# Patient Record
Sex: MALE | Employment: FULL TIME | URBAN - METROPOLITAN AREA
[De-identification: names, ages, dates, MRNs, and addresses within clinical notes are randomized per-mention and may not be internally consistent; named-entity substitution may affect disease eponyms.]

---

## 2017-01-15 ENCOUNTER — HOSPITAL ENCOUNTER (OUTPATIENT)
Dept: ULTRASOUND IMAGING | Age: 42
Discharge: HOME | End: 2017-01-15
Payer: COMMERCIAL

## 2017-01-15 DIAGNOSIS — B18.1 CHRONIC HEPATITIS B (HCC): ICD-10-CM

## 2017-01-15 PROCEDURE — 76705 ECHO EXAM OF ABDOMEN: CPT

## 2017-01-17 NOTE — PROGRESS NOTES
Please call patient and let them know that their recent ultrasound is stable with no concerning lesions/masses within the liver. I will see them at their next follow up appointment.     April

## 2017-07-11 ENCOUNTER — OFFICE VISIT (OUTPATIENT)
Dept: HEMATOLOGY | Age: 42
End: 2017-07-11

## 2017-07-11 VITALS
WEIGHT: 164.6 LBS | OXYGEN SATURATION: 98 % | HEART RATE: 64 BPM | TEMPERATURE: 98.5 F | DIASTOLIC BLOOD PRESSURE: 71 MMHG | BODY MASS INDEX: 26.57 KG/M2 | SYSTOLIC BLOOD PRESSURE: 120 MMHG

## 2017-07-11 DIAGNOSIS — B18.1 CHRONIC HEPATITIS B (HCC): Primary | ICD-10-CM

## 2017-07-11 PROBLEM — Z3A.16 16 WEEKS GESTATION OF PREGNANCY: Status: ACTIVE | Noted: 2017-07-11

## 2017-07-11 RX ORDER — OMEPRAZOLE 20 MG/1
20 CAPSULE, DELAYED RELEASE ORAL DAILY
COMMUNITY
End: 2017-07-11 | Stop reason: CLARIF

## 2017-07-11 NOTE — MR AVS SNAPSHOT
Visit Information Date & Time Provider Department Dept. Phone Encounter #  
 7/11/2017 12:45 PM April Cherise Joy NP [facility] [id number] Follow-up Instructions Return in about 6 months (around 1/11/2018). Your Appointments 1/16/2018 10:30 AM  
Follow Up with April Cherise Joy NP [facility] [address] [place] ([address]) Appt Note: Follow Up  
 15[facility] [id number] [facility] [address]  
59 [facility] [address] [facility] Upcoming Health Maintenance Date Due Pneumococcal 19-64 Medium Risk (1 of 1 - PPSV23) 7/23/1992 DTaP/Tdap/Td series (1 - Tdap) 7/23/1994 INFLUENZA AGE 9 TO ADULT 8/1/2017 Allergies as of 7/11/2017  Review Complete On: 7/11/2017 By: Sybil Ellis No Known Allergies Current Immunizations  Never Reviewed No immunizations on file. Not reviewed this visit You Were Diagnosed With   
  
 Codes Comments Chronic hepatitis B ([facility]    -  Primary ICD-10-CM: B18.1 ICD-9-CM: 070.32 Vitals BP Pulse Temp Weight(growth percentile) SpO2 BMI  
 120/71 64 98.5 °F (36.9 °C) (Tympanic) 164 lb 9.6 oz (74.7 kg) 98% 26.57 kg/m2 Smoking Status Never Smoker Vitals History BMI and BSA Data Body Mass Index Body Surface Area  
 26.57 kg/m 2 1.87 m 2 Preferred Pharmacy Pharmacy Name Phone [facility] [id number] - [address] [facility], [address] [place] [address] [phone] Your Updated Medication List  
  
Notice  As of 7/11/2017  1:16 PM  
 You have not been prescribed any medications. We Performed the Following HEPATIC FUNCTION PANEL [14809 CPT(R)] HEPATITIS B, QT, PCR [15132 CPT(R)] Follow-up Instructions Return in about 6 months (around 1/11/2018). Introducing [facility] & HEALTH SERVICES! Dear Gena Bush: 
Thank you for requesting a Voucheres account.   Our records indicate that you already have an active SurveyMonkey account. You can access your account anytime at [email or web address]/Sunshine [employer] Did you know that you can access your hospital and ER discharge instructions at any time in SurveyMonkey? You can also review all of your test results from your hospital stay or ER visit. Additional Information If you have questions, please visit the Frequently Asked Questions section of the SurveyMonkey website at [email or web address] [email or web address]/Yik Yakt/. Remember, SurveyMonkey is NOT to be used for urgent needs. For medical emergencies, dial 911. Now available from your iPhone and Android! Please provide this summary of care documentation to your next provider. Your primary care clinician is listed as [address]. If you have any questions after today's visit, please call [phone].

## 2017-07-12 LAB
ALBUMIN SERPL-MCNC: 4.5 G/DL (ref 3.5–5.5)
ALP SERPL-CCNC: 68 IU/L (ref 39–117)
ALT SERPL-CCNC: 41 IU/L (ref 0–44)
AST SERPL-CCNC: 27 IU/L (ref 0–40)
BILIRUB DIRECT SERPL-MCNC: 0.12 MG/DL (ref 0–0.4)
BILIRUB SERPL-MCNC: 0.4 MG/DL (ref 0–1.2)
HBV DNA SERPL NAA+PROBE-ACNC: NORMAL IU/ML
HBV DNA SERPL NAA+PROBE-LOG IU: 4.28 LOG10IU/ML
PROT SERPL-MCNC: 7.4 G/DL (ref 6–8.5)
REF LAB TEST REF RANGE: NORMAL

## 2017-07-12 RX ORDER — TENOFOVIR DISOPROXIL FUMARATE 300 MG/1
300 TABLET, FILM COATED ORAL DAILY
COMMUNITY
End: 2017-07-12 | Stop reason: CLARIF

## 2018-01-16 ENCOUNTER — OFFICE VISIT (OUTPATIENT)
Dept: HEMATOLOGY | Age: 43
End: 2018-01-16

## 2018-01-16 VITALS
SYSTOLIC BLOOD PRESSURE: 122 MMHG | BODY MASS INDEX: 27.7 KG/M2 | HEART RATE: 79 BPM | WEIGHT: 171.6 LBS | OXYGEN SATURATION: 96 % | DIASTOLIC BLOOD PRESSURE: 74 MMHG | TEMPERATURE: 95.6 F

## 2018-01-16 DIAGNOSIS — R10.822 LEFT UPPER QUADRANT ABDOMINAL TENDERNESS WITH REBOUND TENDERNESS: ICD-10-CM

## 2018-01-16 DIAGNOSIS — B18.1 CHRONIC HEPATITIS B (HCC): Primary | ICD-10-CM

## 2018-01-16 NOTE — MR AVS SNAPSHOT
1111 [facility] [id number] [address] [place] [address] 
[phone] Patient: Oly Flores MRN: [id number] ATB:0[birth year]6006 Visit Information Date & Time Provider Department Dept. Phone Encounter #  
 1/16/2018 10:30 AM April MOLLY Jain, [facility] 219 [id number] Follow-up Instructions Return in about 6 months (around 7/16/2018). Upcoming Health Maintenance Date Due Pneumococcal 19-64 Medium Risk (1 of 1 - PPSV23) 7/23/1992 DTaP/Tdap/Td series (1 - Tdap) 7/23/1994 Influenza Age 5 to Adult 8/1/2017 Allergies as of 1/16/2018  Review Complete On: 1/16/2018 By: Janis Cruz NP No Known Allergies Current Immunizations  Never Reviewed No immunizations on file. Not reviewed this visit You Were Diagnosed With   
  
 Codes Comments Chronic hepatitis B ([facility]    -  Primary ICD-10-CM: B18.1 ICD-9-CM: 070.32 Left upper quadrant abdominal tenderness with rebound tenderness     ICD-10-CM: R10.822 ICD-9-CM: 789.62 Vitals BP Pulse Temp Weight(growth percentile) SpO2 BMI  
 122/74 (BP 1 Location: Left arm, BP Patient Position: Sitting) 79 95.6 °F (35.3 °C) (Tympanic) 171 lb 9.6 oz (77.8 kg) 96% 27.7 kg/m2 Smoking Status Never Smoker BMI and BSA Data Body Mass Index Body Surface Area  
 27.7 kg/m 2 1.9 m 2 Preferred Pharmacy Pharmacy Name Phone [facility] [id number] - [place], [address] [place] [address] [phone] Your Updated Medication List  
  
Notice  As of 1/16/2018 11:00 AM  
 You have not been prescribed any medications. We Performed the Following CBC W/O DIFF [53190 CPT(R)] HEPATITIS B, QT, PCR [17963 CPT(R)] METABOLIC PANEL, COMPREHENSIVE [33098 CPT(R)] Follow-up Instructions Return in about 6 months (around 7/16/2018). To-Do List   
 01/16/2018 Imaging:  US ABD COMP Introducing hospitals & HEALTH SERVICES! Dear Marianna Bernabe: 
Thank you for requesting a Sanivation account. Our records indicate that you already have an active Sanivation account. You can access your account anytime at [email or web address]. [email or web address]/Retention Science Did you know that you can access your hospital and ER discharge instructions at any time in Sanivation? You can also review all of your test results from your hospital stay or ER visit. Additional Information If you have questions, please visit the Frequently Asked Questions section of the Sanivation website at [email or web address] Remember, Sanivation is NOT to be used for urgent needs. For medical emergencies, dial 911. Now available from your iPhone and Android! Please provide this summary of care documentation to your next provider. Your primary care clinician is listed as 535Nisa Amaya. If you have any questions after today's visit, please call [phone].

## 2018-01-16 NOTE — PROGRESS NOTES
1. Have you been to the ER, urgent care clinic since your last visit? Hospitalized since your last visit? No    2. Have you seen or consulted any other health care providers outside of the [address] [place] [address] since your last visit? Include any pap smears or colon screening.  No   Chief Complaint   Patient presents with    Follow-up     Visit Vitals    /74 (BP 1 Location: Left arm, BP Patient Position: Sitting)    Pulse 79    Temp 95.6 °F (35.3 °C) (Tympanic)    Wt 171 lb 9.6 oz (77.8 kg)    SpO2 96%    BMI 27.7 kg/m2     PHQ over the last two weeks 1/16/2018   Little interest or pleasure in doing things Not at all   Feeling down, depressed or hopeless Not at all   Total Score PHQ 2 0     Learning Assessment 1/16/2018   PRIMARY LEARNER Patient   HIGHEST LEVEL OF EDUCATION - PRIMARY LEARNER  -   BARRIERS PRIMARY LEARNER -   CO-LEARNER CAREGIVER -   PRIMARY LANGUAGE ENGLISH   LEARNER PREFERENCE PRIMARY LISTENING   LEARNING SPECIAL TOPICS -   ANSWERED BY patient   RELATIONSHIP SELF

## 2018-01-17 LAB
ALBUMIN SERPL-MCNC: 4.4 G/DL (ref 3.5–5.5)
ALBUMIN/GLOB SERPL: 1.5 {RATIO} (ref 1.2–2.2)
ALP SERPL-CCNC: 61 IU/L (ref 39–117)
ALT SERPL-CCNC: 29 IU/L (ref 0–44)
AST SERPL-CCNC: 20 IU/L (ref 0–40)
BILIRUB SERPL-MCNC: 0.5 MG/DL (ref 0–1.2)
BUN SERPL-MCNC: 13 MG/DL (ref 6–24)
BUN/CREAT SERPL: 11 (ref 9–20)
CALCIUM SERPL-MCNC: 9.2 MG/DL (ref 8.7–10.2)
CHLORIDE SERPL-SCNC: 100 MMOL/L (ref 96–106)
CO2 SERPL-SCNC: 26 MMOL/L (ref 18–29)
CREAT SERPL-MCNC: 1.21 MG/DL (ref 0.76–1.27)
ERYTHROCYTE [DISTWIDTH] IN BLOOD BY AUTOMATED COUNT: 12.7 % (ref 12.3–15.4)
GLOBULIN SER CALC-MCNC: 2.9 G/DL (ref 1.5–4.5)
GLUCOSE SERPL-MCNC: 154 MG/DL (ref 65–99)
HBV DNA SERPL NAA+PROBE-ACNC: 9020 IU/ML
HBV DNA SERPL NAA+PROBE-LOG IU: 3.96 LOG10IU/ML
HCT VFR BLD AUTO: 45 % (ref 37.5–51)
HGB BLD-MCNC: 15.3 G/DL (ref 13–17.7)
MCH RBC QN AUTO: 28.6 PG (ref 26.6–33)
MCHC RBC AUTO-ENTMCNC: 34 G/DL (ref 31.5–35.7)
MCV RBC AUTO: 84 FL (ref 79–97)
PLATELET # BLD AUTO: 171 X10E3/UL (ref 150–379)
POTASSIUM SERPL-SCNC: 4.1 MMOL/L (ref 3.5–5.2)
PROT SERPL-MCNC: 7.3 G/DL (ref 6–8.5)
RBC # BLD AUTO: 5.35 X10E6/UL (ref 4.14–5.8)
REF LAB TEST REF RANGE: NORMAL
SODIUM SERPL-SCNC: 141 MMOL/L (ref 134–144)
WBC # BLD AUTO: 4.1 X10E3/UL (ref 3.4–10.8)

## 2018-01-17 NOTE — PROGRESS NOTES
[address] Darrius Garcia MD, [place], [facility] Keila Adon[place], [place]       KITTY Parish PA-C Salvador Honer, [facility]   KITTY Allan NP        at [address], [address] April Lion  22.     [phone]     FAX: [phone]    at [facility], [address] [place] [address], [address]     [phone]     FAX: [phone]       PCP: Paula Fink MD  GI: Thelma Menezes     Patient Active Problem List    Diagnosis Date Noted    Chronic hepatitis B ([facility] 12/02/2011    S/P sinus surgery 12/02/2011     Eliana loza a 38 y.o. male returns to the [address] regarding chronic HBV and its management. The active problem list, all pertinent past medical history, medications, radiologic findings and laboratory findings related to the liver disorder were reviewed with the patient. The patient has not required treatment for his HBV due to his inactive state. His HBV DNA has remained at low levels and his liver enzymes within normal range. At his last office visit in July 2017, his HBV DNA had significantly increased but his liver enzymes remained normal. He was told to return in 1-2 months to repeat blood work. He did not return. He presents to the clinic today to monitor his HBV. The most recent laboratory studies indicate that the liver transaminases are normal, alkaline phosphatase is normal, tests of hepatic synthetic and metabolic function are normal, and the platelet count is normal.     The patient feels well but does have generalized abdominal discomfort. There is no N/V/D associated with this pain. The patient completes all daily activities without any functional limitations. He continues to work full time. The patient has not noted fatigue, arthralgias, swelling of the lower extremities, hematemesis, hematochezia.      ALLERGIES:   No Known Allergies    MEDICATIONS:  No current outpatient prescriptions on file prior to visit. No current facility-administered medications on file prior to visit. REVIEW OF SYSTEMS:   Systems related to all organ systems were reviewed. All were negative except as noted above. FAMILY/SOCIAL HISTORY:   The patient is . There are 2 children. The patient has never smoked tobacco products. The patient has never consumed significant amounts of alcohol. The patient currently works full time as mailhandler. The patient's wife is also from [place]. He states that she has been tested but does not have HBV. Unclear if she has no markers or immune. The 2 children were vaccinated after birth. PHYSICAL EXAMINATION:   Vital Signs:   Visit Vitals    /74 (BP 1 Location: Left arm, BP Patient Position: Sitting)    Pulse 79    Temp 95.6 °F (35.3 °C) (Tympanic)    Wt 171 lb 9.6 oz (77.8 kg)    SpO2 96%    BMI 27.7 kg/m2     General: No acute distress. Eyes: Sclera anicteric. ENT: No oral lesions, thyroid normal.   Nodes: No adenopathy. Skin: No spider angiomata, jaundice, palmar erythema. Respiratory: Lungs clear to auscultation. Cardiovascular: Regular heart rate, no murmurs, no JVD. Abdomen: Soft non-tender, liver size normal to percussion/palpation. Spleen not palpable. No obvious ascites. Extremities: No edema, no muscle wasting, no gross arthritic changes. Neurologic: Alert and oriented, cranial nerves grossly intact, no asterixsis.      LABORATORY STUDIES:   [facility] [place] of [address] Units 1/16/2018 7/11/2017   WBC 3.4 - 10.8 x10E3/uL 4.1    HGB 13.0 - 17.7 g/dL 15.3     - 379 x10E3/uL 171    AST 0 - 40 IU/L 20 27   ALT 0 - 44 IU/L 29 41   Alk Phos 39 - 117 IU/L 61 68   Bili, Total 0.0 - 1.2 mg/dL 0.5 0.4   Bili, Direct 0.00 - 0.40 mg/dL  0.12   Albumin 3.5 - 5.5 g/dL 4.4 4.5   BUN 6 - 24 mg/dL 13    Creat 0.76 - 1.27 mg/dL 1.21    Na 134 - 144 mmol/L 141    K 3.5 - 5.2 mmol/L 4.1    Cl 96 - 106 mmol/L 100    CO2 18 - 29 mmol/L 26    Glucose 65 - 99 mg/dL 154 (H)    Virology Latest Ref Rng & Units  7/11/2017   HBV DNA IU/mL  07107     A hepatic function panel and HBV DNA were ordered today. Will review results when available. SEROLOGIES:   11/2011: HBV DNA 8,470 IU/ml, anti-HCV negative. LIVER HISTOLOGY:   Not performed     ENDOSCOPIC PROCEDURES:   Not performed     RADIOLOGY:   2009: Ultrasound liver. Reported to be normal.   12/2015. Ultrasound of liver. Diffuse hepatic steatosis. No liver mass lesions. No dilated bile ducts. No ascites. 1/2017. Ultrasound of liver. Echogenic consistent with fatty liver. No liver mass lesions. No dilated bile ducts. No ascites. OTHER TESTING:   Not available     ASSESSMENT AND PLAN:   Chronic HBV with normal liver transaminases, normal liver function and a normal platelet count. HBV DNA is pending. The picture is consistent with inactive HBV. No treatment is required at this time. Will review HBV DNA from today's visit to confirm this. There is no need to perform liver biopsy. He will continue regular follow up to monitor his chronic HBV. No signs or symptoms of significant liver fibrosis. Abdominal pain. US has been ordered to further evaluate. The patient was counseled regarding alcohol consumption. There are no contraindications for the patient to take any medications that are necessary for treatment of other medical issues. [facility] 75. surveillance. Patient is up to date with an unremarkable ultrasound in January 2017. Next imaging will be in January 2018. This was ordered today. [facility] 87 in 6 months.      Jah Barr NP  Liver [place] [address], [address] [place] [address]  Ph: [phone]  Fax: [phone]  Email: [email or web address]

## 2018-01-21 ENCOUNTER — HOSPITAL ENCOUNTER (OUTPATIENT)
Dept: ULTRASOUND IMAGING | Age: 43
Discharge: HOME | End: 2018-01-21
Payer: COMMERCIAL

## 2018-01-21 DIAGNOSIS — R10.822 LEFT UPPER QUADRANT ABDOMINAL TENDERNESS WITH REBOUND TENDERNESS: ICD-10-CM

## 2018-01-21 DIAGNOSIS — B18.1 CHRONIC HEPATITIS B (HCC): ICD-10-CM

## 2018-01-21 PROCEDURE — 76700 US EXAM ABDOM COMPLETE: CPT

## 2018-09-11 ENCOUNTER — OFFICE VISIT (OUTPATIENT)
Dept: HEMATOLOGY | Age: 43
End: 2018-09-11

## 2018-09-11 VITALS
WEIGHT: 159 LBS | DIASTOLIC BLOOD PRESSURE: 73 MMHG | OXYGEN SATURATION: 97 % | HEART RATE: 64 BPM | SYSTOLIC BLOOD PRESSURE: 113 MMHG | TEMPERATURE: 97.9 F | BODY MASS INDEX: 25.66 KG/M2

## 2018-09-11 DIAGNOSIS — B18.1 CHRONIC HEPATITIS B (HCC): Primary | ICD-10-CM

## 2018-09-11 NOTE — PROGRESS NOTES
1. Have you been to the ER, urgent care clinic since your last visit? Hospitalized since your last visit? No    2. Have you seen or consulted any other health care providers outside of the [address] [place] [address] since your last visit? Include any pap smears or colon screening. No   Chief Complaint   Patient presents with    Follow-up     Visit Vitals    /73 (BP 1 Location: Left arm, BP Patient Position: Sitting)    Pulse 64    Temp 97.9 °F (36.6 °C) (Tympanic)    Wt 159 lb (72.1 kg)    SpO2 97%    BMI 25.66 kg/m2     PHQ over the last two weeks 9/11/2018   Little interest or pleasure in doing things Not at all   Feeling down, depressed, irritable, or hopeless Not at all   Total Score PHQ 2 0     Learning Assessment 9/11/2018   PRIMARY LEARNER Patient   HIGHEST LEVEL OF EDUCATION - PRIMARY LEARNER  -   BARRIERS PRIMARY LEARNER NONE   CO-LEARNER CAREGIVER No   PRIMARY LANGUAGE ENGLISH   LEARNER PREFERENCE PRIMARY LISTENING   LEARNING SPECIAL TOPICS -   ANSWERED BY patient   RELATIONSHIP SELF     Abuse Screening Questionnaire 9/11/2018   Do you ever feel afraid of your partner? N   Are you in a relationship with someone who physically or mentally threatens you? N   Is it safe for you to go home?  Priyanka Aguirre

## 2018-09-11 NOTE — MR AVS SNAPSHOT
1111 [facility] [id number] [address] [place] [address] 
[phone] Patient: Herbert Parikh MRN: [id number] ZXS:8/23/1926 Visit Information Date & Time Provider Department Dept. Phone Encounter #  
 9/11/2018  1:00 PM April MOLLY [place] 96, [facility] 219 [id number] Follow-up Instructions Return in about 6 months (around 3/11/2019). Upcoming Health Maintenance Date Due Pneumococcal 19-64 Medium Risk (1 of 1 - PPSV23) 7/23/1992 DTaP/Tdap/Td series (1 - Tdap) 7/23/1994 Influenza Age 5 to Adult 8/1/2018 Allergies as of 9/11/2018  Review Complete On: 9/11/2018 By: Janis Watson NP No Known Allergies Current Immunizations  Never Reviewed No immunizations on file. Not reviewed this visit You Were Diagnosed With   
  
 Codes Comments Chronic hepatitis B ([facility]    -  Primary ICD-10-CM: B18.1 ICD-9-CM: 070.32 Vitals BP Pulse Temp Weight(growth percentile) SpO2 BMI  
 113/73 (BP 1 Location: Left arm, BP Patient Position: Sitting) 64 97.9 °F (36.6 °C) (Tympanic) 159 lb (72.1 kg) 97% 25.66 kg/m2 Smoking Status Never Smoker BMI and BSA Data Body Mass Index Body Surface Area  
 25.66 kg/m 2 1.83 m 2 Preferred Pharmacy Pharmacy Name Phone [facility] # [id number] - [address] [facility] [address], [address] [place] [address] [phone] Your Updated Medication List  
  
Notice  As of 9/11/2018  1:30 PM  
 You have not been prescribed any medications. We Performed the Following AFP WITH AFP-L3% [[id number] Custom] CBC W/O DIFF [87385 CPT(R)] HEPATITIS B, QT, PCR [62206 CPT(R)] HEPATITIS BE AB [33664 CPT(R)] HEPATITIS BE AG [79013 CPT(R)] METABOLIC PANEL, COMPREHENSIVE [40398 CPT(R)] Follow-up Instructions Return in about 6 months (around 3/11/2019). To-Do List   
 01/11/2019 Imaging:  US ABD LTD Introducing [facility] & HEALTH SERVICES! Dear Juancarlos Hyatt: 
Thank you for requesting a ClearEdge3D account. Our records indicate that you already have an active ClearEdge3D account. You can access your account anytime at [email or web address] Did you know that you can access your hospital and ER discharge instructions at any time in ClearEdge3D? You can also review all of your test results from your hospital stay or ER visit. Additional Information If you have questions, please visit the Frequently Asked Questions section of the ClearEdge3D website at [email or web address] Remember, ClearEdge3D is NOT to be used for urgent needs. For medical emergencies, dial 911. Now available from your iPhone and Android! Please provide this summary of care documentation to your next provider. Your primary care clinician is listed as [address]Nisa Amaya. If you have any questions after today's visit, please call [phone].

## 2018-09-11 NOTE — PROGRESS NOTES
[address] E Rebound MD Jose, [address], [facility], [place]       KITTY Umana PA-C Cleora Haddock, [facility]   KITTY Cruz Res, NP        at [address], [address] [facility], [address]     [phone]     FAX: [phone]    at [address], [address] [place] [address], [address]     [phone]     FAX: [phone]       PCP: Mahi Johnson MD  GI: Bhavin Wan     Patient Active Problem List    Diagnosis Date Noted    Chronic hepatitis B ([facility] 12/02/2011    S/P sinus surgery 12/02/2011     Melissa Irwin is a 37 y.o. male returns to the [address] regarding chronic HBV and its management. The active problem list, all pertinent past medical history, medications, radiologic findings and laboratory findings related to the liver disorder were reviewed with the patient. The patient has not required treatment for his HBV due to his inactive state. His HBV DNA has remained at low levels and his liver enzymes within normal range. At his last office visit in July 2017, his HBV DNA had significantly increased but his liver enzymes remained normal. He was told to return in 1-2 months to repeat blood work. He did not return. He presents to the clinic today to monitor his HBV. The most recent laboratory studies indicate that the liver transaminases are normal, alkaline phosphatase is normal, tests of hepatic synthetic and metabolic function are normal, and the platelet count is normal.     The patient feels well with no new physical complaints today. The patient completes all daily activities without any functional limitations. He continues to work full time. The patient has not noted fatigue, arthralgias, swelling of the lower extremities, hematemesis, hematochezia.      ALLERGIES:   No Known Allergies    MEDICATIONS:  No current outpatient prescriptions on file prior to visit. No current facility-administered medications on file prior to visit. SYSTEM REVIEW NOT RELATED TO LIVER DISEASE OR REVIEWED ABOVE:  Constitution systems: Negative for fever, chills, weight gain, weight loss. Eyes: Negative for visual changes. ENT: Negative for sore throat, painful swallowing. Respiratory: Negative for cough, hemoptysis, SOB. Cardiology: Negative for chest pain, palpitations. GI:  Negative for constipation or diarrhea. : Negative for urinary frequency, dysuria, hematuria, nocturia. Skin: Negative for rash. Hematology: Negative for easy bruising, blood clots. Musculo-skelatal: Negative for back pain, muscle pain, weakness. Neurologic: Negative for headaches, dizziness, vertigo, memory problems not related to HE. Psychology: Negative for anxiety, depression. FAMILY/SOCIAL HISTORY:   The patient is . There are 2 children. The patient has never smoked tobacco products. The patient has never consumed significant amounts of alcohol. The patient currently works full time as mailhandler. The patient's wife is also from [place]. He states that she has been tested but does not have HBV. Unclear if she has no markers or immune. The 2 children were vaccinated after birth. PHYSICAL EXAMINATION:   Vital Signs:   Visit Vitals    /73 (BP 1 Location: Left arm, BP Patient Position: Sitting)    Pulse 64    Temp 97.9 °F (36.6 °C) (Tympanic)    Wt 159 lb (72.1 kg)    SpO2 97%    BMI 25.66 kg/m2     General: No acute distress. Eyes: Sclera anicteric. ENT: No oral lesions, thyroid normal.   Nodes: No adenopathy. Skin: No spider angiomata, jaundice, palmar erythema. Respiratory: Lungs clear to auscultation. Cardiovascular: Regular heart rate, no murmurs, no JVD. Abdomen: Soft non-tender, liver size normal to percussion/palpation. Spleen not palpable. No obvious ascites.    Extremities: No edema, no muscle wasting, no gross arthritic changes. Neurologic: Alert and oriented, cranial nerves grossly intact, no asterixsis. LABORATORY STUDIES:   Liver [place] of [address] Units 9/11/2018 1/16/2018   WBC 3.4 - 10.8 x10E3/uL 5.1 4.1   HGB 13.0 - 17.7 g/dL 14.6 15.3    - 379 x10E3/uL 160 171   AST 0 - 40 IU/L 20 20   ALT 0 - 44 IU/L 17 29   Alk Phos 39 - 117 IU/L 72 61   Bili, Total 0.0 - 1.2 mg/dL 0.8 0.5   Bili, Direct 0.00 - 0.40 mg/dL     Albumin 3.5 - 5.5 g/dL 4.5 4.4   BUN 6 - 24 mg/dL 15 13   Creat 0.76 - 1.27 mg/dL 1.06 1.21   Na 134 - 144 mmol/L 140 141   K 3.5 - 5.2 mmol/L 4.2 4.1   Cl 96 - 106 mmol/L 101 100   CO2 20 - 29 mmol/L 24 26   Glucose 65 - 99 mg/dL 111 (H) 154 (H)   Virology Latest Ref Rng & Units 9/11/2018 1/16/2018   HBV DNA IU/mL 85665 9020   HBV DNA Copies/mL     Cancer Screening Latest Ref Rng & Units 9/11/2018    AFP, Serum 0.0 - 8.0 ng/mL 1.5    AFP-L3% 0.0 - 9.9 % Comment      A hepatic function panel and HBV DNA were ordered today. Will review results when available. SEROLOGIES:   11/2011: HBV DNA 8,470 IU/ml, anti-HCV negative. LIVER HISTOLOGY:   Not performed     ENDOSCOPIC PROCEDURES:   Not performed     RADIOLOGY:   2009: Ultrasound liver. Reported to be normal.   12/2015. Ultrasound of liver. Diffuse hepatic steatosis. No liver mass lesions. No dilated bile ducts. No ascites. 1/2017. Ultrasound of liver. Echogenic consistent with fatty liver. No liver mass lesions. No dilated bile ducts. No ascites. 1/2018. Ultrasound of liver. Echogenic consistent with chronic liver disease. No liver mass lesions. No dilated bile ducts. No ascites. OTHER TESTING:   Not available     ASSESSMENT AND PLAN:   Chronic HBV with normal liver transaminases, normal liver function and a normal platelet count. HBV DNA has significantly increased since last blood work. Directed patient to return to the clinic in 3 months (December 2018) for a 'lab only' appointment to recheck labs.  If it is still elevated, Vemlidy will be prescribed. There is no need to perform liver biopsy. He will continue regular follow up to monitor his chronic HBV. No signs or symptoms of significant liver fibrosis. [facility] 75. surveillance. Patient is up to date with an unremarkable ultrasound in January 2018. Next imaging will be in January 2019. This was ordered and scheduled today. The patient was counseled regarding alcohol consumption. There are no contraindications for the patient to take any medications that are necessary for treatment of other medical issues. [facility] [place] [address] in 6 months.      Erasmo Kohli NP  Liver [place] [address]  Ph: [phone]  Fax: [phone]  Email: [email or web address]

## 2018-09-12 LAB
AFP L3 MFR SERPL: NORMAL % (ref 0–9.9)
AFP SERPL-MCNC: 1.5 NG/ML (ref 0–8)
ALBUMIN SERPL-MCNC: 4.5 G/DL (ref 3.5–5.5)
ALBUMIN/GLOB SERPL: 1.6 {RATIO} (ref 1.2–2.2)
ALP SERPL-CCNC: 72 IU/L (ref 39–117)
ALT SERPL-CCNC: 17 IU/L (ref 0–44)
AST SERPL-CCNC: 20 IU/L (ref 0–40)
BILIRUB SERPL-MCNC: 0.8 MG/DL (ref 0–1.2)
BUN SERPL-MCNC: 15 MG/DL (ref 6–24)
BUN/CREAT SERPL: 14 (ref 9–20)
CALCIUM SERPL-MCNC: 9.3 MG/DL (ref 8.7–10.2)
CHLORIDE SERPL-SCNC: 101 MMOL/L (ref 96–106)
CO2 SERPL-SCNC: 24 MMOL/L (ref 20–29)
CREAT SERPL-MCNC: 1.06 MG/DL (ref 0.76–1.27)
ERYTHROCYTE [DISTWIDTH] IN BLOOD BY AUTOMATED COUNT: 13.1 % (ref 12.3–15.4)
GLOBULIN SER CALC-MCNC: 2.8 G/DL (ref 1.5–4.5)
GLUCOSE SERPL-MCNC: 111 MG/DL (ref 65–99)
HBV DNA SERPL NAA+PROBE-ACNC: NORMAL IU/ML
HBV DNA SERPL NAA+PROBE-LOG IU: 4.38 LOG10 IU/ML
HBV E AG SERPL QL IA: NEGATIVE
HCT VFR BLD AUTO: 42.4 % (ref 37.5–51)
HGB BLD-MCNC: 14.6 G/DL (ref 13–17.7)
MCH RBC QN AUTO: 28.6 PG (ref 26.6–33)
MCHC RBC AUTO-ENTMCNC: 34.4 G/DL (ref 31.5–35.7)
MCV RBC AUTO: 83 FL (ref 79–97)
PLATELET # BLD AUTO: 160 X10E3/UL (ref 150–379)
POTASSIUM SERPL-SCNC: 4.2 MMOL/L (ref 3.5–5.2)
PROT SERPL-MCNC: 7.3 G/DL (ref 6–8.5)
RBC # BLD AUTO: 5.1 X10E6/UL (ref 4.14–5.8)
REF LAB TEST REF RANGE: NORMAL
SODIUM SERPL-SCNC: 140 MMOL/L (ref 134–144)
WBC # BLD AUTO: 5.1 X10E3/UL (ref 3.4–10.8)

## 2018-09-13 LAB — HBV E AB SERPL QL IA: POSITIVE

## 2018-12-23 ENCOUNTER — HOSPITAL ENCOUNTER (OUTPATIENT)
Dept: ULTRASOUND IMAGING | Age: 43
Discharge: HOME | End: 2018-12-23
Attending: NURSE PRACTITIONER
Payer: COMMERCIAL

## 2018-12-23 DIAGNOSIS — B18.1 CHRONIC HEPATITIS B (HCC): ICD-10-CM

## 2018-12-23 PROCEDURE — 76705 ECHO EXAM OF ABDOMEN: CPT

## 2019-03-12 ENCOUNTER — OFFICE VISIT (OUTPATIENT)
Dept: HEMATOLOGY | Age: 44
End: 2019-03-12

## 2019-03-12 VITALS
OXYGEN SATURATION: 98 % | TEMPERATURE: 97.9 F | BODY MASS INDEX: 26.26 KG/M2 | WEIGHT: 163.4 LBS | HEART RATE: 69 BPM | HEIGHT: 66 IN | DIASTOLIC BLOOD PRESSURE: 70 MMHG | SYSTOLIC BLOOD PRESSURE: 114 MMHG

## 2019-03-12 DIAGNOSIS — B18.1 CHRONIC HEPATITIS B (HCC): Primary | ICD-10-CM

## 2019-03-12 NOTE — PROGRESS NOTES
[address] MD Jose, [address], [place], [place]       Amena Roe, KITTY Issa, AMILCAR Figueroa, [facility]   KITTY Power NP        at [address], [address] [address].     [phone]     FAX: [phone]    at [address], [address]     [phone]     FAX: [phone]     PCP: Ragini Stewart MD  GI: John Quan     Patient Active Problem List    Diagnosis Date Noted    Chronic hepatitis B ([facility] 12/02/2011    S/P sinus surgery 12/02/2011     Essie Moore is a 37 y.o. male returns to the [address] regarding chronic HBV and its management. The active problem list, all pertinent past medical history, medications, radiologic findings and laboratory findings related to the liver disorder were reviewed with the patient. The patient has not required treatment for his HBV due to his inactive state. His HBV DNA level has been elevated twice in the last 3 checks but his LFTs have remained normal.     At his last office visit in July 2017, his HBV DNA had significantly increased but his liver enzymes remained normal. He was told to return in 1-2 months to repeat blood work. His level had decreased in 1/2018 but was elevated again in 9/2018. He was told to come in 12/2018 to have levels rechecked per results letter. I do not see any lab orders or results for that time frame. He presents to the clinic today to monitor his HBV. The most recent laboratory studies indicate the liver transaminases are normal, alkaline phosphatase is normal, tests of hepatic synthetic and metabolic function are normal and the platelet count is normal.     The patient feels well with no new physical complaints today. The patient completes all daily activities without any functional limitations.  He continues to work full time. The patient has not noted fatigue, arthralgias, swelling of the lower extremities, hematemesis or hematochezia. ALLERGIES:   No Known Allergies    MEDICATIONS:  No current outpatient medications on file prior to visit. No current facility-administered medications on file prior to visit. SYSTEM REVIEW NOT RELATED TO LIVER DISEASE OR REVIEWED ABOVE:  Constitution systems: Negative for fever, chills, weight gain, weight loss. Eyes: Negative for visual changes. ENT: Negative for sore throat, painful swallowing. Respiratory: Negative for cough, hemoptysis, SOB. Cardiology: Negative for chest pain, palpitations. GI:  Negative for constipation or diarrhea. : Negative for urinary frequency, dysuria, hematuria, nocturia. Skin: Negative for rash. Hematology: Negative for easy bruising, blood clots. Musculo-skeletal: Negative for back pain, muscle pain, weakness. Neurologic: Negative for headaches, dizziness, vertigo, memory problems not related to HE. Psychology: Negative for anxiety, depression. FAMILY/SOCIAL HISTORY:   The patient is . The patient has 2 children. The patient has never smoked tobacco products. The patient has never consumed significant amounts of alcohol. The patient currently works full time as . The patient's wife is also from [place]. He states she has been tested but does not have HBV. Unclear if she has no markers or immune. The 2 children were vaccinated after birth. PHYSICAL EXAMINATION:   Visit Vitals  /70 (BP 1 Location: Left arm, BP Patient Position: Sitting)   Pulse 69   Temp 97.9 °F (36.6 °C) (Tympanic)   Ht 5' 6\" (1.676 m)   Wt 163 lb 6.4 oz (74.1 kg)   SpO2 98%   BMI 26.37 kg/m²     General: No acute distress. Eyes: Sclera anicteric. ENT: No oral lesions. Nodes: No adenopathy. Skin: No spider angiomata, jaundice, palmar erythema. Respiratory: Lungs clear to auscultation.    Cardiovascular: Regular heart rate, no murmurs, no JVD. Abdomen: Soft non-tender, liver size normal to percussion/palpation. Spleen not palpable. No obvious ascites. Extremities: No edema, no muscle wasting, no gross arthritic changes. Neurologic: Alert and oriented, cranial nerves grossly intact, no asterixis. LABORATORY STUDIES:   Liver [place] [address] & Units 9/11/2018 1/16/2018   WBC 3.4 - 10.8 x10E3/uL 5.1 4.1   HGB 13.0 - 17.7 g/dL 14.6 15.3    - 379 x10E3/uL 160 171   AST 0 - 40 IU/L 20 20   ALT 0 - 44 IU/L 17 29   Alk Phos 39 - 117 IU/L 72 61   Bili, Total 0.0 - 1.2 mg/dL 0.8 0.5   Bili, Direct 0.00 - 0.40 mg/dL     Albumin 3.5 - 5.5 g/dL 4.5 4.4   BUN 6 - 24 mg/dL 15 13   Creat 0.76 - 1.27 mg/dL 1.06 1.21   Na 134 - 144 mmol/L 140 141   K 3.5 - 5.2 mmol/L 4.2 4.1   Cl 96 - 106 mmol/L 101 100   CO2 20 - 29 mmol/L 24 26   Glucose 65 - 99 mg/dL 111 (H) 154 (H)     Liver [place] [address] Ref Rng & Units 7/11/2017   WBC 3.4 - 10.8 x10E3/uL    HGB 13.0 - 17.7 g/dL     - 379 x10E3/uL    AST 0 - 40 IU/L 27   ALT 0 - 44 IU/L 41   Alk Phos 39 - 117 IU/L 68   Bili, Total 0.0 - 1.2 mg/dL 0.4   Bili, Direct 0.00 - 0.40 mg/dL 0.12   Albumin 3.5 - 5.5 g/dL 4.5   BUN 6 - 24 mg/dL    Creat 0.76 - 1.27 mg/dL    Na 134 - 144 mmol/L    K 3.5 - 5.2 mmol/L    Cl 96 - 106 mmol/L    CO2 20 - 29 mmol/L    Glucose 65 - 99 mg/dL      SEROLOGIES:   Virology Latest Ref Rng & Units 9/11/2018 1/16/2018 7/11/2017   HBV DNA IU/mL 23,900 9,020 19,200   HBV DNA Copies/mL        11/2011: HBV DNA 8,470 IU/ml, anti-HCV negative. LIVER HISTOLOGY:   Not performed     ENDOSCOPIC PROCEDURES:   Not performed     RADIOLOGY:   1/2019. Abdominal ultrasound. Echogenic consistent with chronic liver disease. No liver masses or lesions. No dilated bile ducts. No ascites. 1/2018. Ultrasound of liver. Echogenic consistent with chronic liver disease. No liver mass lesions. No dilated bile ducts.  No ascites. 1/2017. Ultrasound of liver. Echogenic consistent with fatty liver. No liver mass lesions. No dilated bile ducts. No ascites. 12/2015. Ultrasound of liver. Diffuse hepatic steatosis. No liver mass lesions. No dilated bile ducts. No ascites. 2009: Ultrasound liver. Reported to be normal.     OTHER TESTING:   Not available     ASSESSMENT AND PLAN:   Chronic HBV with normal liver transaminases, normal liver function and a normal platelet count. HBV DNA has significantly increased since last blood work. Will recheck numbers today. If it is still elevated, Vemlidy will be prescribed. There is no need to perform liver biopsy. He will continue regular follow up to monitor his chronic HBV. No signs or symptoms of significant liver fibrosis. [facility]. Last imaging in 12/2018 and no signs of hepatic mass or lesion. Last AFP ordered 9/11/2018. This needs to be repeated annually. The patient was counseled regarding alcohol consumption. There are no contraindications for the patient to take any medications necessary for treatment of other medical issues. [facility] 87 in 6 months.      SACHA Weber-BC  Liver [place] of [facility] [address] [facility] [email or web address], [address] [address]  [address]  22.  [phone]

## 2019-03-12 NOTE — PROGRESS NOTES
1. Have you been to the ER, urgent care clinic since your last visit? Hospitalized since your last visit? No    2. Have you seen or consulted any other health care providers outside of the [address] [place] [address] since your last visit? Include any pap smears or colon screening. No     Chief Complaint   Patient presents with    Follow-up     Visit Vitals  /70 (BP 1 Location: Left arm, BP Patient Position: Sitting)   Pulse 69   Temp 97.9 °F (36.6 °C) (Tympanic)   Ht 5' 6\" (1.676 m)   Wt 163 lb 6.4 oz (74.1 kg)   SpO2 98%   BMI 26.37 kg/m²     3 most recent PHQ Screens 3/12/2019   Little interest or pleasure in doing things Not at all   Feeling down, depressed, irritable, or hopeless Not at all   Total Score PHQ 2 0     Learning Assessment 3/12/2019   PRIMARY LEARNER Patient   HIGHEST LEVEL OF EDUCATION - PRIMARY LEARNER  -   BARRIERS PRIMARY LEARNER NONE   CO-LEARNER CAREGIVER No   PRIMARY LANGUAGE ENGLISH   LEARNER PREFERENCE PRIMARY LISTENING   LEARNING SPECIAL TOPICS -   ANSWERED BY patient   RELATIONSHIP SELF     Abuse Screening Questionnaire 3/12/2019   Do you ever feel afraid of your partner? N   Are you in a relationship with someone who physically or mentally threatens you? N   Is it safe for you to go home?  Murleen Nissen

## 2019-03-13 LAB
ALBUMIN SERPL-MCNC: 4.7 G/DL (ref 3.5–5.5)
ALP SERPL-CCNC: 64 IU/L (ref 39–117)
ALT SERPL-CCNC: 27 IU/L (ref 0–44)
AST SERPL-CCNC: 22 IU/L (ref 0–40)
BILIRUB DIRECT SERPL-MCNC: 0.15 MG/DL (ref 0–0.4)
BILIRUB SERPL-MCNC: 0.7 MG/DL (ref 0–1.2)
BUN SERPL-MCNC: 13 MG/DL (ref 6–24)
BUN/CREAT SERPL: 13 (ref 9–20)
CALCIUM SERPL-MCNC: 9 MG/DL (ref 8.7–10.2)
CHLORIDE SERPL-SCNC: 101 MMOL/L (ref 96–106)
CO2 SERPL-SCNC: 26 MMOL/L (ref 20–29)
CREAT SERPL-MCNC: 1.02 MG/DL (ref 0.76–1.27)
ERYTHROCYTE [DISTWIDTH] IN BLOOD BY AUTOMATED COUNT: 12.8 % (ref 12.3–15.4)
GLUCOSE SERPL-MCNC: 110 MG/DL (ref 65–99)
HCT VFR BLD AUTO: 43.5 % (ref 37.5–51)
HGB BLD-MCNC: 14.7 G/DL (ref 13–17.7)
INR PPP: 1 (ref 0.8–1.2)
MCH RBC QN AUTO: 28.7 PG (ref 26.6–33)
MCHC RBC AUTO-ENTMCNC: 33.8 G/DL (ref 31.5–35.7)
MCV RBC AUTO: 85 FL (ref 79–97)
PLATELET # BLD AUTO: 186 X10E3/UL (ref 150–379)
POTASSIUM SERPL-SCNC: 4 MMOL/L (ref 3.5–5.2)
PROT SERPL-MCNC: 7.3 G/DL (ref 6–8.5)
PROTHROMBIN TIME: 10.5 SEC (ref 9.1–12)
RBC # BLD AUTO: 5.12 X10E6/UL (ref 4.14–5.8)
SODIUM SERPL-SCNC: 139 MMOL/L (ref 134–144)
WBC # BLD AUTO: 3.8 X10E3/UL (ref 3.4–10.8)

## 2019-03-14 LAB
HBV DNA SERPL NAA+PROBE-ACNC: NORMAL IU/ML
HBV DNA SERPL NAA+PROBE-LOG IU: 4.71 LOG10 IU/ML
REF LAB TEST REF RANGE: NORMAL

## 2019-05-06 ENCOUNTER — TELEPHONE (OUTPATIENT)
Dept: HEMATOLOGY | Age: 44
End: 2019-05-06

## 2019-05-06 DIAGNOSIS — B18.1 CHRONIC HEPATITIS B (HCC): Primary | ICD-10-CM

## 2019-05-06 NOTE — TELEPHONE ENCOUNTER
Patient call stating he received a call from his pharmacy that [facility] was ready for him to . Patient reviewed My Chart to find a message from Sadiq Garcia NP, who stated his levels were elevated and to begin treatment. Patient is requesting a second blood draw  confirmation since the last results are from two months ago. Patient questioned why he was finding out via My Chart two months later. Apologized to patient. Inform patient this nurse will review results with PARDEEP Gaston and return his call. Reviewed results with PARDEEP Gaston. Recommendation was for patient to repeat labs. Decision will be made after reviewing repeat lab results. 11:15am:  Patient informed to repeat labs. Patient requesting orders be faxed to the [facility] near [place]. Phone number given to me by patient. Location confirmed via internet search. Order will be faxed to location . Apologized to patient again for the last notice of results. Patient had no further questions.

## 2019-05-08 LAB
ERYTHROCYTE [DISTWIDTH] IN BLOOD BY AUTOMATED COUNT: 12.8 % (ref 12.3–15.4)
HCT VFR BLD AUTO: 46.2 % (ref 37.5–51)
HGB BLD-MCNC: 15.2 G/DL (ref 13–17.7)
MCH RBC QN AUTO: 28.2 PG (ref 26.6–33)
MCHC RBC AUTO-ENTMCNC: 32.9 G/DL (ref 31.5–35.7)
MCV RBC AUTO: 86 FL (ref 79–97)
PLATELET # BLD AUTO: 181 X10E3/UL (ref 150–379)
RBC # BLD AUTO: 5.39 X10E6/UL (ref 4.14–5.8)
WBC # BLD AUTO: 5.2 X10E3/UL (ref 3.4–10.8)

## 2019-05-09 LAB
AFP L3 MFR SERPL: NORMAL % (ref 0–9.9)
AFP SERPL-MCNC: 1.8 NG/ML (ref 0–8)
ALBUMIN SERPL-MCNC: 4.5 G/DL (ref 3.5–5.5)
ALP SERPL-CCNC: 69 IU/L (ref 39–117)
ALT SERPL-CCNC: 29 IU/L (ref 0–44)
AST SERPL-CCNC: 21 IU/L (ref 0–40)
BILIRUB DIRECT SERPL-MCNC: 0.15 MG/DL (ref 0–0.4)
BILIRUB SERPL-MCNC: 0.7 MG/DL (ref 0–1.2)

## 2019-05-10 LAB
HBV DNA SERPL NAA+PROBE-ACNC: NORMAL IU/ML
HBV DNA SERPL NAA+PROBE-LOG IU: 4.03 LOG10 IU/ML
REF LAB TEST REF RANGE: NORMAL

## 2019-05-13 NOTE — PROGRESS NOTES
Reviewed labs with DALJIT Wetzel, while patient meets criteria by APASL guidelines, this would be lifelong therapy and the HBV value has been variable for the past 3 years. Will defer start of medication at present and recommend follow-up in 10/2019 with Fibroscan at that time.

## 2019-10-14 ENCOUNTER — OFFICE VISIT (OUTPATIENT)
Dept: HEMATOLOGY | Age: 44
End: 2019-10-14

## 2019-10-14 VITALS
WEIGHT: 165 LBS | OXYGEN SATURATION: 100 % | DIASTOLIC BLOOD PRESSURE: 84 MMHG | BODY MASS INDEX: 26.63 KG/M2 | HEART RATE: 61 BPM | SYSTOLIC BLOOD PRESSURE: 122 MMHG | TEMPERATURE: 96.1 F

## 2019-10-14 DIAGNOSIS — B18.1 CHRONIC HEPATITIS B (HCC): Primary | ICD-10-CM

## 2019-10-14 LAB
ERYTHROCYTE [DISTWIDTH] IN BLOOD BY AUTOMATED COUNT: 11.8 % (ref 12.3–15.4)
HCT VFR BLD AUTO: 45.3 % (ref 37.5–51)
HGB BLD-MCNC: 16 G/DL (ref 13–17.7)
MCH RBC QN AUTO: 29.2 PG (ref 26.6–33)
MCHC RBC AUTO-ENTMCNC: 35.3 G/DL (ref 31.5–35.7)
MCV RBC AUTO: 83 FL (ref 79–97)
PLATELET # BLD AUTO: 170 X10E3/UL (ref 150–450)
RBC # BLD AUTO: 5.48 X10E6/UL (ref 4.14–5.8)
WBC # BLD AUTO: 4.4 X10E3/UL (ref 3.4–10.8)

## 2019-10-14 NOTE — PROGRESS NOTES
[facility], MD, [address], Cite Kristal Hernández MD Irene Munster, AMILCAR Love, [facility]     Janis Whiting, [facility]   MARIANN Marie-LEANNA Clements, [facility]       [facility]    at [address], [address] [place] [address], [place] [address].    [phone]    FAX: [address] [place] [address], [address]    [phone]    FAX: [phone]     Patient Care Team:  Jeffery Sneed MD as PCP - General (Family Practice)  Lynn Navarrete MD (Gastroenterology)  Lynn Navarrete MD (Gastroenterology)    Patient Active Problem List    Diagnosis Date Noted    [facility] 12/02/2011    S/P sinus surgery 12/02/2011     Lawrence Sunshine is a 53 y.o. male returns to the [address] regarding chronic HBV and its management. The active problem list, all pertinent past medical history, medications, radiologic findings and laboratory findings related to the liver disorder were reviewed with the patient. The patient has not required treatment for his HBV due to his lack of signs and symptoms of advanced liver disease. His HBV DNA level has been over 2000 since 6/2015. We had discussed starting therapy but I wanted to see what his fibrosis level was prior to initiating therapy to see if therapy warranted. It looks like it was repeated while I was out on medical leave. He presents to the clinic today to monitor his HBV and to check a fibrosis level with FibroScan.      The most recent laboratory studies indicate the liver transaminases are normal, alkaline phosphatase is normal, tests of hepatic synthetic and metabolic function are normal and the platelet count is normal.     The patient feels well with no new physical complaints today. The patient completes all daily activities without any functional limitations. He continues to work full time. The patient has not noted fatigue, arthralgias, swelling of the lower extremities, hematemesis or hematochezia. ALLERGIES:   No Known Allergies    MEDICATIONS:  Current Outpatient Medications on File Prior to Visit   Medication Sig    [DISCONTINUED] tenofovir ALAFENAMIDE FUMARATE (VEMLIDY) 25 mg tab Take 25 mg by mouth daily. Indications: chronic hepatitis B     No current facility-administered medications on file prior to visit. SYSTEM REVIEW NOT RELATED TO LIVER DISEASE OR REVIEWED ABOVE:  Constitution systems: Negative for fever, chills, weight gain, weight loss. Eyes: Negative for visual changes. ENT: Negative for sore throat, painful swallowing. Respiratory: Negative for cough, hemoptysis, SOB. Cardiology: Negative for chest pain, palpitations. GI:  Negative for constipation or diarrhea. : Negative for urinary frequency, dysuria, hematuria, nocturia. Skin: Negative for rash. Hematology: Negative for easy bruising, blood clots. Musculo-skeletal: Negative for back pain, muscle pain, weakness. Neurologic: Negative for headaches, dizziness, vertigo, memory problems not related to HE. Psychology: Negative for anxiety, depression. FAMILY/SOCIAL HISTORY:   The patient is . The patient has 2 children. The patient has never smoked tobacco products. The patient has never consumed significant amounts of alcohol. The patient currently works full time as . The patient's wife is also from [place]. He states she has been tested but does not have HBV. Unclear if she has markers or immunity. The 2 children were vaccinated after birth.      PHYSICAL EXAMINATION:   Visit Vitals  /84 (BP 1 Location: Left arm, BP Patient Position: Sitting)   Pulse 61   Temp 96.1 °F (35.6 °C) (Tympanic)   Wt 165 lb (74.8 kg) SpO2 100%   BMI 26.63 kg/m²     General: No acute distress. Eyes: Sclera anicteric. ENT: No oral lesions. Nodes: No adenopathy. Skin: No spider angiomata, jaundice, palmar erythema. Respiratory: Lungs clear to auscultation. Cardiovascular: Regular heart rate, no murmurs, no JVD. Abdomen: Soft non-tender, liver size normal to percussion/palpation. Spleen not palpable. No obvious ascites. Extremities: No edema, no muscle wasting, no gross arthritic changes. Neurologic: Alert and oriented, cranial nerves grossly intact, no asterixis. LABORATORY STUDIES:   Liver [place] of [address] [place] [address] & Units 9/11/2018 1/16/2018   WBC 3.4 - 10.8 x10E3/uL 5.1 4.1   HGB 13.0 - 17.7 g/dL 14.6 15.3    - 379 x10E3/uL 160 171   AST 0 - 40 IU/L 20 20   ALT 0 - 44 IU/L 17 29   Alk Phos 39 - 117 IU/L 72 61   Bili, Total 0.0 - 1.2 mg/dL 0.8 0.5   Bili, Direct 0.00 - 0.40 mg/dL     Albumin 3.5 - 5.5 g/dL 4.5 4.4   BUN 6 - 24 mg/dL 15 13   Creat 0.76 - 1.27 mg/dL 1.06 1.21   Na 134 - 144 mmol/L 140 141   K 3.5 - 5.2 mmol/L 4.2 4.1   Cl 96 - 106 mmol/L 101 100   CO2 20 - 29 mmol/L 24 26   Glucose 65 - 99 mg/dL 111 (H) 154 (H)     Liver [place] of [address] [place] [address] Ref Rng & Units 7/11/2017   WBC 3.4 - 10.8 x10E3/uL    HGB 13.0 - 17.7 g/dL     - 379 x10E3/uL    AST 0 - 40 IU/L 27   ALT 0 - 44 IU/L 41   Alk Phos 39 - 117 IU/L 68   Bili, Total 0.0 - 1.2 mg/dL 0.4   Bili, Direct 0.00 - 0.40 mg/dL 0.12   Albumin 3.5 - 5.5 g/dL 4.5   BUN 6 - 24 mg/dL    Creat 0.76 - 1.27 mg/dL    Na 134 - 144 mmol/L    K 3.5 - 5.2 mmol/L    Cl 96 - 106 mmol/L    CO2 20 - 29 mmol/L    Glucose 65 - 99 mg/dL      SEROLOGIES:   Virology Latest Ref Rng & Units 5/8/2019 3/12/2019 9/11/2018 1/16/2018   HBV DNA IU/mL 10,800 51,200 23,900 9,020     11/2011: HBV DNA 8,470 IU/ml, anti-HCV negative. LIVER HISTOLOGY:   10/2019. FibroScan performed at [facility]. EkPa was 4.1. IQR/med 2%. .  The results suggested a fibrosis level of F0. The CAP score suggests fatty liver. ENDOSCOPIC PROCEDURES:   Not performed     RADIOLOGY:   1/2019. Abdominal ultrasound. Echogenic consistent with chronic liver disease. No liver masses or lesions. No dilated bile ducts. No ascites. 1/2018. Ultrasound of liver. Echogenic consistent with chronic liver disease. No liver mass lesions. No dilated bile ducts. No ascites. 1/2017. Ultrasound of liver. Echogenic consistent with fatty liver. No liver mass lesions. No dilated bile ducts. No ascites. 12/2015. Ultrasound of liver. Diffuse hepatic steatosis. No liver mass lesions. No dilated bile ducts. No ascites. 2009: Ultrasound liver. Reported to be normal.     OTHER TESTING:   Not available     ASSESSMENT AND PLAN:   Chronic HBV with normal liver transaminases, normal liver function and a normal platelet count. Although his level is elevated, he has no fibrosis and we will not treat at this time. There is no need to perform liver biopsy. He will continue regular follow up to monitor his chronic HBV. We will repeat the FibroScan annually to assess for fibrotic changes. [facility]. Last imaging in 12/2018 and no signs of hepatic mass or lesion. Last AFP ordered 9/11/2018. This needs to be repeated annually until he is 46years old. The patient was counseled regarding alcohol consumption. There are no contraindications for the patient to take any medications necessary for treatment of other medical issues. Follow up. [facility] 87 in 6 months.      SACHA Pal-BC  Liver [place] of [place] [address] [facility] [address] [facility], [address] [address]  22.  [phone]

## 2019-10-14 NOTE — PROGRESS NOTES
Chief Complaint   Patient presents with    Other     fibroscan     1. Have you been to the ER, urgent care clinic since your last visit? Hospitalized since your last visit? No    2. Have you seen or consulted any other health care providers outside of the [address] [place] [address] since your last visit? Include any pap smears or colon screening. No     3 most recent PHQ Screens 10/14/2019   Little interest or pleasure in doing things Not at all   Feeling down, depressed, irritable, or hopeless Not at all   Total Score PHQ 2 0     Abuse Screening Questionnaire 10/14/2019   Do you ever feel afraid of your partner? N   Are you in a relationship with someone who physically or mentally threatens you? N   Is it safe for you to go home?  Y     Learning Assessment 10/14/2019   PRIMARY LEARNER Patient   HIGHEST LEVEL OF EDUCATION - PRIMARY LEARNER  -   BARRIERS PRIMARY LEARNER NONE   CO-LEARNER CAREGIVER No   PRIMARY LANGUAGE ENGLISH   LEARNER PREFERENCE PRIMARY DEMONSTRATION   LEARNING SPECIAL TOPICS -   ANSWERED BY patient   RELATIONSHIP SELF     Visit Vitals  /84 (BP 1 Location: Left arm, BP Patient Position: Sitting)   Pulse 61   Temp 96.1 °F (35.6 °C) (Tympanic)   Wt 165 lb (74.8 kg)   SpO2 100%   BMI 26.63 kg/m²

## 2019-10-15 LAB
ALBUMIN SERPL-MCNC: 4.7 G/DL (ref 3.5–5.5)
ALP SERPL-CCNC: 67 IU/L (ref 39–117)
ALT SERPL-CCNC: 35 IU/L (ref 0–44)
AST SERPL-CCNC: 27 IU/L (ref 0–40)
BILIRUB DIRECT SERPL-MCNC: 0.17 MG/DL (ref 0–0.4)
BILIRUB SERPL-MCNC: 0.8 MG/DL (ref 0–1.2)
BUN SERPL-MCNC: 15 MG/DL (ref 6–24)
BUN/CREAT SERPL: 13 (ref 9–20)
CALCIUM SERPL-MCNC: 9.5 MG/DL (ref 8.7–10.2)
CHLORIDE SERPL-SCNC: 101 MMOL/L (ref 96–106)
CO2 SERPL-SCNC: 26 MMOL/L (ref 20–29)
CREAT SERPL-MCNC: 1.19 MG/DL (ref 0.76–1.27)
GLUCOSE SERPL-MCNC: 106 MG/DL (ref 65–99)
HAV AB SER QL IA: POSITIVE
HBV DNA SERPL NAA+PROBE-ACNC: NORMAL IU/ML
HBV DNA SERPL NAA+PROBE-LOG IU: 4.33 LOG10 IU/ML
POTASSIUM SERPL-SCNC: 4.4 MMOL/L (ref 3.5–5.2)
PROT SERPL-MCNC: 7.4 G/DL (ref 6–8.5)
REF LAB TEST REF RANGE: NORMAL
SODIUM SERPL-SCNC: 142 MMOL/L (ref 134–144)

## 2019-10-16 LAB
AFP L3 MFR SERPL: NORMAL % (ref 0–9.9)
AFP SERPL-MCNC: 2 NG/ML (ref 0–8)
HCV RNA SERPL QL NAA+PROBE: NEGATIVE

## 2019-12-13 NOTE — PROGRESS NOTES
F0, AFP normal, no exposure to hcv, immune to hav. Fu as scheduled.  msg to pt via [facility]

## 2019-12-14 DIAGNOSIS — B18.1 CHRONIC HEPATITIS B (HCC): ICD-10-CM

## 2020-01-05 ENCOUNTER — HOSPITAL ENCOUNTER (OUTPATIENT)
Dept: ULTRASOUND IMAGING | Age: 45
Discharge: HOME | End: 2020-01-05
Attending: NURSE PRACTITIONER
Payer: COMMERCIAL

## 2020-01-05 DIAGNOSIS — B18.1 CHRONIC HEPATITIS B (HCC): ICD-10-CM

## 2020-01-05 PROCEDURE — 76700 US EXAM ABDOM COMPLETE: CPT

## 2020-07-13 ENCOUNTER — OFFICE VISIT (OUTPATIENT)
Dept: HEMATOLOGY | Age: 45
End: 2020-07-13

## 2020-07-13 VITALS
OXYGEN SATURATION: 98 % | HEART RATE: 67 BPM | DIASTOLIC BLOOD PRESSURE: 75 MMHG | TEMPERATURE: 97.3 F | HEIGHT: 65 IN | WEIGHT: 167.2 LBS | SYSTOLIC BLOOD PRESSURE: 113 MMHG | BODY MASS INDEX: 27.86 KG/M2 | RESPIRATION RATE: 17 BRPM

## 2020-07-13 DIAGNOSIS — B18.1 CHRONIC HEPATITIS B (HCC): Primary | ICD-10-CM

## 2020-07-13 LAB
ERYTHROCYTE [DISTWIDTH] IN BLOOD BY AUTOMATED COUNT: 11.9 % (ref 11.6–15.4)
HCT VFR BLD AUTO: 45.3 % (ref 37.5–51)
HGB BLD-MCNC: 15.1 G/DL (ref 13–17.7)
MCH RBC QN AUTO: 28 PG (ref 26.6–33)
MCHC RBC AUTO-ENTMCNC: 33.3 G/DL (ref 31.5–35.7)
MCV RBC AUTO: 84 FL (ref 79–97)
PLATELET # BLD AUTO: 166 X10E3/UL (ref 150–450)
RBC # BLD AUTO: 5.39 X10E6/UL (ref 4.14–5.8)
WBC # BLD AUTO: 4.5 X10E3/UL (ref 3.4–10.8)

## 2020-07-13 NOTE — PROGRESS NOTES
[facility], Mario HARPER, Judge Bhumi Albarran MD Mariana Donning, AMILCAR Weeks, [facility]     Janis Whiting, [facility]   Lizzy Newell BRENDA-LEANNA Gallardo, [facility]       Pedro Gardner Leonard De Hernandez 136    at [address], [address] [place] [address], [facility] 22.    [phone]    FAX: [address] [place] [address], [address]    [phone]    FAX: [phone]     Patient Care Team:  Michael Swartz MD as PCP - General (Family Practice)  Elba Alfaro MD (Gastroenterology)    Patient Active Problem List    Diagnosis Date Noted    Chronic hepatitis B ([facility] [address] 12/02/2011    S/P sinus surgery 12/02/2011     Jeanne Foster is a 53 y.o. male returns to the [facility] regarding chronic HBV and its management. The active problem list, all pertinent past medical history, medications, radiologic findings and laboratory findings related to the liver disorder were reviewed with the patient. The patient has not required treatment for his HBV due to his lack of signs and symptoms of advanced liver disease. His HBV DNA level has been over 2000 since 6/2015. He presents to the clinic today to monitor his HBV and to check a fibrosis level with FibroScan. The most recent laboratory studies indicate the liver transaminases are normal, alkaline phosphatase is normal, tests of hepatic synthetic and metabolic function are normal and the platelet count is normal.     The patient feels well with no new physical complaints today. The patient completes all daily activities without any functional limitations. He continues to work full time.     The patient has not noted fatigue, arthralgias, swelling of the lower extremities, hematemesis or hematochezia. ALLERGIES:   No Known Allergies    MEDICATIONS:  No current outpatient medications on file prior to visit. No current facility-administered medications on file prior to visit. SYSTEM REVIEW NOT RELATED TO LIVER DISEASE OR REVIEWED ABOVE:  Constitution systems: Negative for fever, chills, weight gain, weight loss. Eyes: Negative for visual changes. ENT: Negative for sore throat, painful swallowing. Respiratory: Negative for cough, hemoptysis, SOB. Cardiology: Negative for chest pain, palpitations. GI:  Negative for constipation or diarrhea. : Negative for urinary frequency, dysuria, hematuria, nocturia. Skin: Negative for rash. Hematology: Negative for easy bruising, blood clots. Musculo-skeletal: Negative for back pain, muscle pain, weakness. Neurologic: Negative for headaches, dizziness, vertigo, memory problems not related to HE. Psychology: Negative for anxiety, depression. FAMILY/SOCIAL HISTORY:   The patient is . The patient has 2 children. The patient has never smoked tobacco products. The patient has never consumed significant amounts of alcohol. The patient currently works full time as . The patient's wife is also from [place]. He states she has been tested but does not have HBV. Unclear if she has markers or immunity. The 2 children were vaccinated after birth. PHYSICAL EXAMINATION:   Visit Vitals  /75 (BP 1 Location: Left arm, BP Patient Position: Sitting)   Pulse 67   Temp 97.3 °F (36.3 °C) (Temporal)   Resp 17   Ht 5' 5\" (1.651 m)   Wt 167 lb 3.2 oz (75.8 kg)   SpO2 98%   BMI 27.82 kg/m²     General: No acute distress. Eyes: Sclera anicteric. ENT: No oral lesions. Nodes: No adenopathy. Skin: No spider angiomata, jaundice, palmar erythema. Respiratory: Lungs clear to auscultation. Cardiovascular: Regular heart rate, no murmurs, no JVD.    Abdomen: Soft non-tender, liver size normal to percussion/palpation. Spleen not palpable. No obvious ascites. Extremities: No edema, no muscle wasting, no gross arthritic changes. Neurologic: Alert and oriented, cranial nerves grossly intact, no asterixis. LABORATORY STUDIES:   [address] Units 10/14/2019 5/8/2019   WBC 3.4 - 10.8 x10E3/uL 4.4 5.2   HGB 13.0 - 17.7 g/dL 16.0 15.2    - 450 x10E3/uL 170 181   INR 0.8 - 1.2     AST 0 - 40 IU/L 27 21   ALT 0 - 44 IU/L 35 29   Alk Phos 39 - 117 IU/L 67 69   Bili, Total 0.0 - 1.2 mg/dL 0.8 0.7   Bili, Direct 0.00 - 0.40 mg/dL 0.17 0.15   Albumin 3.5 - 5.5 g/dL 4.7 4.5   BUN 6 - 24 mg/dL 15    Creat 0.76 - 1.27 mg/dL 1.19    Na 134 - 144 mmol/L 142    K 3.5 - 5.2 mmol/L 4.4    Cl 96 - 106 mmol/L 101    CO2 20 - 29 mmol/L 26    Glucose 65 - 99 mg/dL 106 (H)      Liver [facility] Latest Ref Rng & Units 3/12/2019   WBC 3.4 - 10.8 x10E3/uL 3.8   HGB 13.0 - 17.7 g/dL 14.7    - 450 x10E3/uL 186   INR 0.8 - 1.2 1.0   AST 0 - 40 IU/L 22   ALT 0 - 44 IU/L 27   Alk Phos 39 - 117 IU/L 64   Bili, Total 0.0 - 1.2 mg/dL 0.7   Bili, Direct 0.00 - 0.40 mg/dL 0.15   Albumin 3.5 - 5.5 g/dL 4.7   BUN 6 - 24 mg/dL 13   Creat 0.76 - 1.27 mg/dL 1.02   Na 134 - 144 mmol/L 139   K 3.5 - 5.2 mmol/L 4.0   Cl 96 - 106 mmol/L 101   CO2 20 - 29 mmol/L 26   Glucose 65 - 99 mg/dL 110 (H)     SEROLOGIES:   Virology Latest Ref Rng & Units 10/14/2019 5/8/2019 3/12/2019   HBV DNA IU/mL 21,500 10,800 51,200   HCV RNA, HUDSON, QL Negative Negative       Serologies Latest Ref Rng & Units 10/14/2019   Hep A Ab, Total Negative Positive (A)     11/2011: HBV DNA 8,470 IU/ml, anti-HCV negative. LIVER HISTOLOGY:   10/2019. FibroScan performed at [address]. EkPa was 4.1. IQR/med 2%. . The results suggested a fibrosis level of F0. The CAP score suggests fatty liver. ENDOSCOPIC PROCEDURES:   Not performed     RADIOLOGY:   1/2020. Abdominal ultrasound.  No hepatic mass or lesion. 1/2019. Abdominal ultrasound. Echogenic consistent with chronic liver disease. No liver masses or lesions. No dilated bile ducts. No ascites. 1/2018. Ultrasound of liver. Echogenic consistent with chronic liver disease. No liver mass lesions. No dilated bile ducts. No ascites. 1/2017. Ultrasound of liver. Echogenic consistent with fatty liver. No liver mass lesions. No dilated bile ducts. No ascites. 12/2015. Ultrasound of liver. Diffuse hepatic steatosis. No liver mass lesions. No dilated bile ducts. No ascites. 2009: Ultrasound liver. Reported to be normal.     OTHER TESTING:   Not available     ASSESSMENT AND PLAN:   Chronic HBV with normal liver transaminases, normal liver function and a normal platelet count. Although his level is elevated, he has no fibrosis and we will not treat at this time. There is no need to perform liver biopsy. He will continue regular follow up to monitor his chronic HBV. We will repeat the FibroScan annually to assess for fibrotic changes. [facility]. Last imaging in 1/2020 and no signs of hepatic mass or lesion. Last AFP ordered 10/2019. This needs to be repeated annually until he is 46years old. The patient was counseled regarding alcohol consumption. There are no contraindications for the patient to take any medications necessary for treatment of other medical issues. Follow up. [facility] 87 in 6 months with FibroScan.      SACHA Hensley-BC  Liver [place] [facility] [address] [place] [address], [address]  22.  [phone]

## 2020-07-13 NOTE — PROGRESS NOTES
Visit Vitals  /75 (BP 1 Location: Left arm, BP Patient Position: Sitting)   Pulse 67   Temp 97.3 °F (36.3 °C) (Temporal)   Resp 17   Ht 5' 5\" (1.651 m)   Wt 167 lb 3.2 oz (75.8 kg)   SpO2 98%   BMI 27.82 kg/m²         Abuse Screening Questionnaire 10/14/2019   Do you ever feel afraid of your partner? N   Are you in a relationship with someone who physically or mentally threatens you? N   Is it safe for you to go home? Y     Learning Assessment 10/14/2019   PRIMARY LEARNER Patient   HIGHEST LEVEL OF EDUCATION - PRIMARY LEARNER  -   BARRIERS PRIMARY LEARNER NONE   CO-LEARNER CAREGIVER No   PRIMARY LANGUAGE ENGLISH   LEARNER PREFERENCE PRIMARY DEMONSTRATION   LEARNING SPECIAL TOPICS -   ANSWERED BY patient   RELATIONSHIP SELF     3 most recent PHQ Screens 10/14/2019   Little interest or pleasure in doing things Not at all   Feeling down, depressed, irritable, or hopeless Not at all   Total Score PHQ 2 0     1. Have you been to the ER, urgent care clinic since your last visit? Hospitalized since your last visit? No    2. Have you seen or consulted any other health care providers outside of the [address] [place] [address] since your last visit? Include any pap smears or colon screening.  No

## 2020-07-14 LAB
ALBUMIN SERPL-MCNC: 4.5 G/DL (ref 4–5)
ALP SERPL-CCNC: 62 IU/L (ref 39–117)
ALT SERPL-CCNC: 32 IU/L (ref 0–44)
AST SERPL-CCNC: 25 IU/L (ref 0–40)
BILIRUB DIRECT SERPL-MCNC: 0.12 MG/DL (ref 0–0.4)
BILIRUB SERPL-MCNC: 0.5 MG/DL (ref 0–1.2)
BUN SERPL-MCNC: 14 MG/DL (ref 6–24)
BUN/CREAT SERPL: 12 (ref 9–20)
CALCIUM SERPL-MCNC: 9.2 MG/DL (ref 8.7–10.2)
CHLORIDE SERPL-SCNC: 102 MMOL/L (ref 96–106)
CO2 SERPL-SCNC: 21 MMOL/L (ref 20–29)
CREAT SERPL-MCNC: 1.15 MG/DL (ref 0.76–1.27)
GLUCOSE SERPL-MCNC: 104 MG/DL (ref 65–99)
POTASSIUM SERPL-SCNC: 3.9 MMOL/L (ref 3.5–5.2)
PROT SERPL-MCNC: 7.1 G/DL (ref 6–8.5)
SODIUM SERPL-SCNC: 138 MMOL/L (ref 134–144)

## 2020-07-16 LAB
HBV DNA SERPL NAA+PROBE-ACNC: 4370 IU/ML
HBV DNA SERPL NAA+PROBE-LOG IU: 3.64 LOG10 IU/ML
REF LAB TEST REF RANGE: NORMAL